# Patient Record
Sex: MALE | Race: WHITE
[De-identification: names, ages, dates, MRNs, and addresses within clinical notes are randomized per-mention and may not be internally consistent; named-entity substitution may affect disease eponyms.]

---

## 2018-05-04 ENCOUNTER — HOSPITAL ENCOUNTER (EMERGENCY)
Dept: HOSPITAL 62 - ER | Age: 28
Discharge: HOME | End: 2018-05-04
Payer: COMMERCIAL

## 2018-05-04 VITALS — SYSTOLIC BLOOD PRESSURE: 119 MMHG | DIASTOLIC BLOOD PRESSURE: 64 MMHG

## 2018-05-04 DIAGNOSIS — K21.9: Primary | ICD-10-CM

## 2018-05-04 DIAGNOSIS — F17.210: ICD-10-CM

## 2018-05-04 DIAGNOSIS — Z71.6: ICD-10-CM

## 2018-05-04 DIAGNOSIS — R10.13: ICD-10-CM

## 2018-05-04 PROCEDURE — 99283 EMERGENCY DEPT VISIT LOW MDM: CPT

## 2018-05-04 PROCEDURE — 99406 BEHAV CHNG SMOKING 3-10 MIN: CPT

## 2018-05-04 NOTE — ER DOCUMENT REPORT
ED GI/





- General


Chief Complaint: Abdominal Pain


Stated Complaint: ABDOMINAL PAIN


Time Seen by Provider: 05/04/18 09:53


Mode of Arrival: Ambulatory


Information source: Patient


Notes: 





This 27-year-old male patient comes emergency room complaining of epigastric 

abdominal pain with nausea.  He reports he woke up this morning with the 

discomfort and nausea without vomiting.  At this time the nausea has resolved.  

He reports she was diagnosed with ulcers by physical exam in an emergency room 

in [place] about 5 years ago.  He does not recall the outpatient treatment.  He 

does recall getting a GI cocktail and that improved his symptoms immediately.  

There were no diagnostic studies done.  Diagnosis was based on physical exam 

and response to treatment.





In reviewing the foods and liquids that should be avoided with reflux, the 

patient admitted to consuming and enjoying almost everything I warned him to 

avoid.


Review of his history suggests that he may have never taken over-the-counter 

proton pump inhibitors unless he received a prescription on that ER visit 5 

years ago.


TRAVEL OUTSIDE OF THE [place] IN LAST 30 DAYS: No





Past Medical History





- General


Information source: Patient





- Social History


Smoking Status: Current Every Day Smoker


Cigarette use (# per day): Yes


Chew tobacco use (# tins/day): No


Smoking Education Provided: Yes - 4 minutes spent reviewing health risks of 

continued smoking


Frequency of alcohol use: None


Drug Abuse: None


Lives with: Friend


Family History: Reviewed & Not Pertinent


Patient has suicidal ideation: No


Patient has homicidal ideation: No





- Past Medical History


Cardiac Medical History: Reports: None


Pulmonary Medical History: Reports: None


EENT Medical History: Reports: None


Neurological Medical History: Reports: None


Endocrine Medical History: Reports: None


Renal/ Medical History: Reports: None


GI Medical History: Reports: Hx Gastroesophageal Reflux Disease - Based on the 

patient's past history he most likely has occasional reflux


Musculoskeltal Medical History: Reports None


Skin Medical History: Reports None


Psychiatric Medical History: Reports: None


Surgical Hx: Negative





Review of Systems





- Review of Systems


Constitutional: No symptoms reported


EENT: No symptoms reported


Cardiovascular: No symptoms reported


Respiratory: No symptoms reported


Gastrointestinal: See HPI


Genitourinary: No symptoms reported


Musculoskeletal: No symptoms reported


Skin: No symptoms reported


Hematologic/Lymphatic: No symptoms reported


Neurological/Psychological: No symptoms reported





Physical Exam





- Vital signs


Vitals: 


 











Temp Pulse Resp BP Pulse Ox


 


 97.7 F   80   15   119/64   97 


 


 05/04/18 09:45  05/04/18 09:45  05/04/18 09:45  05/04/18 09:45  05/04/18 09:45














- General


General appearance: Appears well, Alert


In distress: None





- HEENT


Head: Normocephalic, Atraumatic


Eyes: Normal


Pupils: PERRL


Neck: Normal





- Respiratory


Respiratory status: No respiratory distress


Breath sounds: Normal





- Cardiovascular


Rhythm: Regular


Heart sounds: Normal auscultation


Murmur: No





- Abdominal


Inspection: Normal


Bowel sounds: Normal


Tenderness: Tender - Some epigastric tenderness, no right upper quadrant 

abdominal tenderness.  No: McBurney's point, Brooks's sign, Guarding, Rebound





- Back


Back: Normal





- Extremities


General upper extremity: Normal inspection


General lower extremity: Normal inspection





- Neurological


Neuro grossly intact: Yes





- Psychological


Associated symptoms: Normal affect, Normal mood





- Skin


Skin Temperature: Warm


Skin Moisture: Dry


Skin Color: Normal





Course





- Vital Signs


Vital signs: 


 











Temp Pulse Resp BP Pulse Ox


 


 97.7 F   80   15   119/64   97 


 


 05/04/18 09:45  05/04/18 09:45  05/04/18 09:45  05/04/18 09:45  05/04/18 09:45














Discharge





- Discharge


Clinical Impression: 


Gastroesophageal reflux


Qualifiers:


 Esophagitis presence: esophagitis presence not specified Qualified Code(s): 

K21.9 - Gastro-esophageal reflux disease without esophagitis





Condition: Stable


Disposition: HOME, SELF-CARE


Additional Instructions: 


Reflux Disease (GERD)





     Gastro-Esophageal Reflux Disease (GERD) is caused by stomach acid 

refluxing back up into the esophagus. The valve at the end of the esophagus may 

be weak. This is common in persons with a hiatal hernia. GERD symptoms can 

include indigestion, chest pain, heartburn, or food "sticking." Certain foods, 

alcohol, and aspirin can make GERD worse.


     Treatment depends on the severity. Usually, antacids or acid-suppressing 

medicines are used. When the esophagus is acutely inflamed, the physician will 

often prescribe membrane-protective drugs such as Carafate.  Some patients 

benefit from medication such as Reglan that tightens the valve at the top of 

the stomach.


     Avoid those foods that bring on your symptoms. For many people, these 

foods are coffee, chocolate, onions, garlic, and carbonated drinks. Don't use 

alcohol, aspirin, caffeine, or tobacco. Don't eat late at night -- within 4 

hours of bedtime. Don't over-eat. If necessary, elevate the head of your bed 

about 4 inches so that stomach acid will not roll up into your esophagus.


     Call the doctor if you develop severe chest pain, inability to swallow 

fluids, fever, or worsening symptoms.








********************************************************************************

*************************************





Eat a bland diet for a few days.


Take Prilosec OTC twice daily for the next few days, then once daily for 2 

weeks.


Take antacids such as Mylanta or Tums between meals and at bedtime.


Follow-up with a local medical doctor if not improving.





RETURN TO THE EMERGENCY ROOM IF ANY NEW OR WORSENING SYMPTOMS.








Forms:  Return to Work